# Patient Record
Sex: MALE | URBAN - METROPOLITAN AREA
[De-identification: names, ages, dates, MRNs, and addresses within clinical notes are randomized per-mention and may not be internally consistent; named-entity substitution may affect disease eponyms.]

---
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
## 2023-06-20 ENCOUNTER — OFFICE VISIT (OUTPATIENT)
Dept: OBGYN CLINIC | Facility: MEDICAL CENTER | Age: 18
End: 2023-06-20
Payer: MEDICARE

## 2023-06-20 VITALS — WEIGHT: 211 LBS | BODY MASS INDEX: 26.24 KG/M2 | HEIGHT: 75 IN

## 2023-06-20 DIAGNOSIS — S76.301A HAMSTRING INJURY, RIGHT, INITIAL ENCOUNTER: Primary | ICD-10-CM

## 2023-06-20 DIAGNOSIS — M79.604 RIGHT LEG PAIN: ICD-10-CM

## 2023-06-20 PROCEDURE — 99203 OFFICE O/P NEW LOW 30 MIN: CPT | Performed by: EMERGENCY MEDICINE

## 2023-06-20 NOTE — LETTER
June 20, 2023     Patient: Asif Donovan  YOB: 2005  Date of Visit: 6/20/2023      To Whom it May Concern:    Asif Donovan is under my professional care  Izzy Torres was seen in my office on 6/20/2023  May return to restricted duty:  Max lifting/pushing/pulling 5-10 lbs  Mostly sedentary duty with occasional walking/standing  No climbing/crawling  Restrictions until further notice  If you have any questions or concerns, please don't hesitate to call           Sincerely,          Jennie Chopra MD        CC: No Recipients

## 2023-06-20 NOTE — PROGRESS NOTES
"    Assessment/Plan:    Diagnoses and all orders for this visit:    Hamstring injury, right, initial encounter  -     MRI femur right wo contrast; Future  -     XR femur 2 vw right; Future    Right leg pain  -     MRI femur right wo contrast; Future  -     XR femur 2 vw right; Future    MRI Right Femur to r/o distal hamstring tendon injury which may need surgery  Patient does have significant ecchymosis of the posterior thigh  Extensive bruising of the posterior thigh/hamstring with tenderness to palpation of the distal hamstring muscle belly into the medial insertion tendon  The tendon is palpable but to a lesser extent compared to the left  WBAT with crutches, no sports    Return for scheduled by [facility], will call with results  Subjective:   Patient ID: Sylvia Álvarez is a 21 y o  male  NP presents with mother for Right hamstring injury occurring 6/4/23 while playing baseball tagging up off of the third base felt a pop of the hamstring  He took a week off and rested and return to play on a following Sunday and reaggravated his symptoms  He has noticed bruising of the hamstring area  No numbness tingling  Review of Systems    The following portions of the patient's chart were reviewed and updated as appropriate: Allergy:  No Known Allergies    Medications:  No current outpatient medications on file  There is no problem list on file for this patient  Objective:  Ht 6' 3\" (1 905 m)   Wt 95 7 kg (211 lb)   BMI 26 37 kg/m²     Right Knee Exam     Other   Erythema: absent  Sensation: normal    Comments:  Extensive bruising of the posterior thigh/hamstring with tenderness to palpation of the distal hamstring muscle belly into the medial insertion tendon  The tendon is palpable but to a lesser extent compared to the left  Physical Exam  Vitals reviewed  Constitutional:       Appearance: He is well-developed  HENT:      Head: Normocephalic and atraumatic     Eyes:      " Conjunctiva/sclera: Conjunctivae normal    Pulmonary:      Effort: Pulmonary effort is normal    Musculoskeletal:      Cervical back: Neck supple  Skin:     General: Skin is warm and dry  Neurological:      Mental Status: He is alert and oriented to person, place, and time  Psychiatric:         Behavior: Behavior normal            Neurologic Exam     Mental Status   Oriented to person, place, and time  Procedures    There are no pertinent studies obtained with regards to today's office visit  History reviewed  No pertinent past medical history  History reviewed  No pertinent surgical history  Social History     Socioeconomic History   • Marital status: Single     Spouse name: Not on file   • Number of children: Not on file   • Years of education: Not on file   • Highest education level: Not on file   Occupational History   • Not on file   Tobacco Use   • Smoking status: Unknown   • Smokeless tobacco: Not on file   Substance and Sexual Activity   • Alcohol use: Not on file   • Drug use: Not on file   • Sexual activity: Not on file   Other Topics Concern   • Not on file   Social History Narrative   • Not on file     Social Determinants of Health     Financial Resource Strain: Not on file   Food Insecurity: Not on file   Transportation Needs: Not on file   Physical Activity: Not on file   Stress: Not on file   Social Connections: Not on file   Intimate Partner Violence: Not on file   Housing Stability: Not on file       History reviewed  No pertinent family history

## 2023-06-23 ENCOUNTER — TELEPHONE (OUTPATIENT)
Dept: OBGYN CLINIC | Facility: HOSPITAL | Age: 18
End: 2023-06-23

## 2023-06-23 ENCOUNTER — APPOINTMENT (OUTPATIENT)
Dept: RADIOLOGY | Facility: CLINIC | Age: 18
End: 2023-06-23
Payer: MEDICARE

## 2023-06-23 DIAGNOSIS — S76.301A HAMSTRING INJURY, RIGHT, INITIAL ENCOUNTER: ICD-10-CM

## 2023-06-23 DIAGNOSIS — M79.604 RIGHT LEG PAIN: ICD-10-CM

## 2023-06-23 PROCEDURE — 73552 X-RAY EXAM OF FEMUR 2/>: CPT

## 2023-06-23 NOTE — TELEPHONE ENCOUNTER
Caller: Mother Mya    Doctor: Gloria Bailey    Reason for call:     Calling back with fax number for his work, fax # [phone], attn: Lisandra Arzola  Fax completed today    No further action    Call back#: n/a

## 2023-06-23 NOTE — TELEPHONE ENCOUNTER
Caller: Lawrence    Doctor/Office: Clement BURLESON#: [phone]    Work or school note: work    Return to work/school date: 6/20/23    Fax #: will call with fax number    Is there any restrictions that need to be updated? If so, what? Pt tried light duty but could not do the work    He is asking to be out of work

## 2023-06-27 ENCOUNTER — TELEPHONE (OUTPATIENT)
Dept: OBGYN CLINIC | Facility: CLINIC | Age: 18
End: 2023-06-27

## 2023-06-28 ENCOUNTER — HOSPITAL ENCOUNTER (OUTPATIENT)
Dept: MRI IMAGING | Facility: HOSPITAL | Age: 18
Discharge: HOME | End: 2023-06-28
Attending: EMERGENCY MEDICINE
Payer: MEDICARE

## 2023-06-28 DIAGNOSIS — S76.301A HAMSTRING INJURY, RIGHT, INITIAL ENCOUNTER: ICD-10-CM

## 2023-06-28 DIAGNOSIS — M79.604 RIGHT LEG PAIN: ICD-10-CM

## 2023-06-28 PROCEDURE — 73718 MRI LOWER EXTREMITY W/O DYE: CPT

## 2023-06-28 PROCEDURE — G1004 CDSM NDSC: HCPCS

## 2023-06-29 ENCOUNTER — TELEPHONE (OUTPATIENT)
Dept: OBGYN CLINIC | Facility: HOSPITAL | Age: 18
End: 2023-06-29

## 2023-06-29 DIAGNOSIS — S76.301A HAMSTRING INJURY, RIGHT, INITIAL ENCOUNTER: Primary | ICD-10-CM

## 2023-06-29 DIAGNOSIS — M79.604 RIGHT LEG PAIN: ICD-10-CM

## 2023-06-29 NOTE — TELEPHONE ENCOUNTER
Spoke to patient and relayed MRI results.  May WBAT, PT ordered, remain out of work, f/u 4 weeks.  Patient requested I call his mother to discuss the results of the MRI I did call her and leave a message with the findings and my recommendations.

## 2023-07-05 ENCOUNTER — TELEPHONE (OUTPATIENT)
Dept: OBGYN CLINIC | Facility: HOSPITAL | Age: 18
End: 2023-07-05

## 2023-07-05 NOTE — TELEPHONE ENCOUNTER
Caller: Patient' mother Mya    Doctor: Vinny Santillan    Reason for call: Patient's employer is asking him to RTW. He is calling for your opinion on this. Please advise.     Call back#: [phone]

## 2023-07-06 NOTE — TELEPHONE ENCOUNTER
Caller: mom    Doctor: Yi Rivera    Reason for call: patient states they are interested in going to work on the 10th.  They want to know the Drs opinion    Also requested recent work status note to be faxed over to [employer] # [phone]  -work note was electronically faxed      Call back#: [phone]

## 2023-07-07 ENCOUNTER — TELEPHONE (OUTPATIENT)
Dept: OBGYN CLINIC | Facility: HOSPITAL | Age: 18
End: 2023-07-07

## 2023-07-07 NOTE — TELEPHONE ENCOUNTER
Caller: patient    Doctor/Office: Clement    CB#:       What needs to be faxed: 6/24/23 work note    ATTN to: Cassandra Linn    Fax#: [phone]      Documents were successfully e-faxed

## 2023-07-13 ENCOUNTER — TELEPHONE (OUTPATIENT)
Dept: OBGYN CLINIC | Facility: CLINIC | Age: 18
End: 2023-07-13

## 2023-07-13 ENCOUNTER — EVALUATION (OUTPATIENT)
Dept: PHYSICAL THERAPY | Facility: CLINIC | Age: 18
End: 2023-07-13
Payer: MEDICARE

## 2023-07-13 DIAGNOSIS — M79.604 RIGHT LEG PAIN: Primary | ICD-10-CM

## 2023-07-13 DIAGNOSIS — S76.301A HAMSTRING INJURY, RIGHT, INITIAL ENCOUNTER: ICD-10-CM

## 2023-07-13 PROCEDURE — 97161 PT EVAL LOW COMPLEX 20 MIN: CPT

## 2023-07-13 PROCEDURE — 97110 THERAPEUTIC EXERCISES: CPT

## 2023-07-13 NOTE — PROGRESS NOTES
PT Evaluation     Today's date: 2023  Patient name: Mary Villanueva  : 2005  MRN: [id number]  Referring provider: Nohemi Abraham MD  Dx:   Encounter Diagnosis     ICD-10-CM    1. Right leg pain  M79.604 Ambulatory Referral to Physical Therapy      2. Hamstring injury, right, initial encounter  S76.301A Ambulatory Referral to Physical Therapy                     Assessment  Assessment details: Pt is a 21y.o. year old male that presents to outpatient physical therapy with a hamstring injury to the R LE. Pt demonstrate pain with palpation to the R biceps femoris, decrease hamstring length on the R vs the L, and limited muscle strength to the R knee/hip extensors. Pt demonstrates increased pain, decreased ROM, decreased strength, decreased activity tolerance, and decreased functional activity due to pain. Education was given on typical physiological repsonse following muscle strain and benefit of physical therapy for return to PLOF. Education was given on the risk of re-injury. Pt appears motivated. HEP was given and reviewed with patient. would benefit from skilled physical therapy to address noted impairments, meet patient's goals, and to return to PLOF. Thank you for this referral.     Impairments: abnormal gait, abnormal muscle tone, abnormal or restricted ROM, activity intolerance, impaired physical strength, lacks appropriate home exercise program and pain with function    Symptom irritability: lowUnderstanding of Dx/Px/POC: good   Prognosis: good    Goals  STGs:   1. Pt will be able to demonstrate an increase of strength by at least 1/2 grade within 4 weeks   2. Pt will be able to achieve increased ROM by at least 25% within 4 weeks. 3. Pt will be able to report pain less than 3/10 at worse within 4 weeks. 4. Pt will be able to demonstrate improved hip abductor strength to at least 4+/5 MMT within 4 weeks   5.  Pt will improve prone knee flexion strength by at least 10 lbs of force at 45 degrees of flexion within 4 weeks  6. Pt will be able to demonstrate less than or equal to 5 degrees of knee extension difference with H-test on the R within 4 weeks. LTGs:   1. Pt will be independent with all IADLs without pain or discomfort upon discharge. 2. Pt will be independent with HEP upon discharge   3. Pt will be able to report no pain or discomfort with all work duties and recreational activities for a full day upon discharge. 4. Pt will be able to report 'no difficulty' with heavy household activities such as cleaning or lifting at least 10 lbs to waist upon discharge       Plan  Patient would benefit from: PT eval and skilled physical therapy  Planned modality interventions: low level laser therapy, thermotherapy: hydrocollator packs, electrical stimulation/Russian stimulation and cryotherapy  Planned therapy interventions: manual therapy, massage, Ortiz taping, muscle pump exercises, neuromuscular re-education, patient education, strengthening, stretching, therapeutic activities, therapeutic exercise, therapeutic training, home exercise program, functional ROM exercises, flexibility, breathing training, body mechanics training, balance, IASTM, joint mobilization, activity modification, abdominal trunk stabilization and balance/weight bearing training  Frequency: 2x week  Duration in weeks: 8  Treatment plan discussed with: patient        Subjective Evaluation    History of Present Illness  Mechanism of injury: Pt states that he has been have pain/discomfort for the past few weeks. He states that he pulled his hamstring playing baseball 6/4/2023. Did not get it checked out or did not get any xrays done. He was resting it and putting ice on it. He was feeling better and he started playing baseball again on 6/18/2023 and its back to hurting him again. Reports that the back of his thigh was bruised. Recent MRI finds partial tear at the myotendinous junction of the biceps femur.   He is also missing some work because of his pain. Has not been playing any sport since. Denies numbness/tingling      AGGS: walking, running  EASES: ice, rest, stretching   Goals: decrease pain, return to sport/running without pain       Imaging findings: MRI on 2023 - (R) Hematoma formation in the posterior mid thigh located in between the biceps femoris and semitendinosis muscles with a partial tear at the myotendinous junction of the biceps femoris. The proximal common hamstring tendons appear normal.    Patient Goals  Patient goals for therapy: decreased pain, increased motion, increased strength, return to sport/leisure activities and return to work    Pain  Current pain ratin  At best pain ratin  At worst pain ratin  Quality: dull ache, tight and pulling          Objective     Palpation     Right   Hypertonic in the proximal biceps femoris. Tenderness of the proximal biceps femoris.      Additional Palpation Details  Maximum tenderness and tightness noted with palpation 16 cm proximal to R knee joint line at biceps femoris     Active Range of Motion     Right Knee   Normal active range of motion    Strength/Myotome Testing     Left Hip   Planes of Motion   Flexion: 4+  Extension: 4+  Abduction: 4  Adduction: 4+    Right Hip   Planes of Motion   Flexion: 4  Extension: 4  Abduction: 4-  Adduction: 4    Left Knee   Extension: 4+  Quadriceps contraction: good    Right Knee   Extension: 4+  Quadriceps contraction: good    Left Ankle/Foot   Dorsiflexion: 4+  Plantar flexion: 5    Right Ankle/Foot   Dorsiflexion: 4+  Plantar flexion: 5    Additional Strength Details  Prone knee flexion (L):     - 90 degrees = 24.6    - 45 degrees = 23.4     - 15 degrees = 26.0    Prone knee flexion (L):     - 90 degrees = 28.8    - 45 degrees = 22.4     - 15 degrees = 25.3    Tests     Additional Tests Details  Hamstring length in 90/90 supine:     - Right side = lacking 45%     - Left side = lacking 38%    Single leg sit to stand     - limited knee control with sit to stand test with noted genu valgus (B)    Prone hip extension activation - significant hamstring activation prior to glut max with testing bilaterally (R>L)               Precautions: none    Daily Treatment Diary    Date 7/13            FOTO IE -             Re-Eval IE               Manuals    Biceps Femoris IASTM              HS stretch              Bernardino stretch                           Neuro Re-Ed     Prone glut/HS ISO             Double Leg RDLs             Bridge with PPT 3x10                                                                Ther Ex    Recumbent bike - ROM             Seated HS stretch  30"x3            Supine knee slides              Prone eccentric HS curl              Side plank with hip abduction              Dead bug             Side stepping w/ TB Green TB 2x10 ea direction                                                                 Ther Activity                              Gait Training                              Modalities    ICE

## 2023-07-13 NOTE — TELEPHONE ENCOUNTER
Caller: Patient's mom    Doctor: Ariella Allen    Reason for call: Pt is requesting a return to work letter dated for Monday 7/17/23.   Fax# [phone]  ATTN: ANGELES    Call back#: [phone]

## 2023-07-14 NOTE — TELEPHONE ENCOUNTER
Caller: Mom    Doctor/Office: Clement    Call back#: [phone]    What needs to be faxed: Work note    ATTN to: Muna Stevens    Fax#: *[phone]      Documents were successfully e-faxed

## 2023-07-20 ENCOUNTER — OFFICE VISIT (OUTPATIENT)
Dept: PHYSICAL THERAPY | Facility: CLINIC | Age: 18
End: 2023-07-20
Payer: MEDICARE

## 2023-07-20 DIAGNOSIS — M79.604 RIGHT LEG PAIN: Primary | ICD-10-CM

## 2023-07-20 DIAGNOSIS — S76.301A HAMSTRING INJURY, RIGHT, INITIAL ENCOUNTER: ICD-10-CM

## 2023-07-20 PROCEDURE — 97112 NEUROMUSCULAR REEDUCATION: CPT

## 2023-07-20 PROCEDURE — 97110 THERAPEUTIC EXERCISES: CPT

## 2023-07-20 PROCEDURE — 97140 MANUAL THERAPY 1/> REGIONS: CPT

## 2023-07-20 NOTE — PROGRESS NOTES
Daily Note     Today's date: 2023  Patient name: Andie Kan  : 2005  MRN: [id number]  Referring provider: Maribel Rivers MD  Dx:   Encounter Diagnosis     ICD-10-CM    1. Right leg pain  M79.604       2. Hamstring injury, right, initial encounter  S76.301A                      Subjective: Pt states that he is feeling pretty good. Indicates that he had a very busy/heavy lifting day today at work and his hamstring felt pretty good overall. Indicates he has been able to complete the HEP regularly. Objective: See treatment diary below      Assessment: Tolerated treatment well. Manual techniques were perform to improve the R hamstring length and to improve hip mobility. Activities were also performed to improve HS and proximal hip strength. Pt denied symtpoms with activities today. [facility] and to limit activities to 'within tolerance' and to limit stress on R hamstring. Patient demonstrated fatigue post treatment and would benefit from continued PT      Plan: Continue per plan of care.       Precautions: none    Daily Treatment Diary    Date            FOTO IE -             Re-Eval IE               Manuals    Biceps Femoris IASTM   NV           HS stretch   JM           Bernardino stretch              Hip PROM   JM                         Neuro Re-Ed     Prone glut/HS ISO  NV           Double Leg RDLs  3x10 15# ea UE           Bridge with PPT 3x10 10x                                                               Ther Ex    Recumbent bike - ROM  upright bike 5 mins           Seated HS stretch  30"x3 3" 30x           Supine knee slides   On TBall 3x7           Prone eccentric HS curl   5# rapid flexion eccentric extension 3x10            Side plank with hip abduction   NV           Dead bug  NV           Side stepping w/ TB Green TB 2x10 ea direction  Figure 8 pink TB 30" 6x                                                               Ther Activity Gait Training                              Modalities    ICE

## 2023-07-24 ENCOUNTER — OFFICE VISIT (OUTPATIENT)
Dept: PHYSICAL THERAPY | Facility: CLINIC | Age: 18
End: 2023-07-24
Payer: MEDICARE

## 2023-07-24 DIAGNOSIS — M79.604 RIGHT LEG PAIN: Primary | ICD-10-CM

## 2023-07-24 DIAGNOSIS — S76.301A HAMSTRING INJURY, RIGHT, INITIAL ENCOUNTER: ICD-10-CM

## 2023-07-24 PROCEDURE — 97110 THERAPEUTIC EXERCISES: CPT

## 2023-07-24 PROCEDURE — 97140 MANUAL THERAPY 1/> REGIONS: CPT

## 2023-07-24 PROCEDURE — 97112 NEUROMUSCULAR REEDUCATION: CPT

## 2023-07-24 NOTE — PROGRESS NOTES
Daily Note     Today's date: 2023  Patient name: Jose Avendano  : 2005  MRN: [id number]  Referring provider: Bertha Duron MD  Dx:   Encounter Diagnosis     ICD-10-CM    1. Right leg pain  M79.604       2. Hamstring injury, right, initial encounter  S76.301A                      Subjective: Pt states that he is doing well. Reports that he felt good following last session, and has not had much pain over the last few days. Reports that he has not returned to sports yet. Objective: See treatment diary below      Assessment: Tolerated treatment well. Activities were performed to improve hamstring strength, length, endurance, and proprioception. Denied pain throughout session. Able to tolerate increase weight and reps. Recommended to continue to limit return to full sports at this time. Patient demonstrated fatigue post treatment and would benefit from continued PT      Plan: Continue per plan of care.       Precautions: none    Daily Treatment Diary    Date           FOTO IE -             Re-Eval IE               Manuals    Biceps Femoris IASTM   NV JM          HS stretch   [facility]          Bernardino stretch              Hip PROM   JM  JM                       Neuro Re-Ed     Prone glut/HS ISO  NV 3x10          Double Leg RDLs  3x10 15# ea UE 2x10 25# ea UE    SL RDLs 25# 2x10          Bridge with PPT 3x10 10x SL 2x10                                                               Ther Ex    Recumbent bike - ROM  upright bike 5 mins Upright bike 1 mile           Seated HS stretch  30"x3 3" 30x 3" 30x          Supine knee slides   On TBall 3x7 NV          Prone eccentric HS curl   5# rapid flexion eccentric extension 3x10  7.5# rapid flexion eccentric extension 3x10           Side plank with hip abduction   NV           Dead bug  NV           Side stepping w/ TB Green TB 2x10 ea direction  Figure 8 pink TB 30" 6x Figure 8 pink TB 30" 6x          Leg press   185# 3x10 Ther Activity                              Gait Training                              Modalities    ICE

## 2023-07-27 ENCOUNTER — OFFICE VISIT (OUTPATIENT)
Dept: PHYSICAL THERAPY | Facility: CLINIC | Age: 18
End: 2023-07-27
Payer: MEDICARE

## 2023-07-27 DIAGNOSIS — S76.301A HAMSTRING INJURY, RIGHT, INITIAL ENCOUNTER: ICD-10-CM

## 2023-07-27 DIAGNOSIS — M79.604 RIGHT LEG PAIN: Primary | ICD-10-CM

## 2023-07-27 PROCEDURE — 97110 THERAPEUTIC EXERCISES: CPT

## 2023-07-27 PROCEDURE — 97140 MANUAL THERAPY 1/> REGIONS: CPT

## 2023-07-27 PROCEDURE — 97112 NEUROMUSCULAR REEDUCATION: CPT

## 2023-07-27 NOTE — PROGRESS NOTES
Daily Note     Today's date: 2023  Patient name: Ingrid Munoz  : 2005  MRN: [id number]  Referring provider: Wil Bloom MD  Dx:   Encounter Diagnosis     ICD-10-CM    1. Right leg pain  M79.604       2. Hamstring injury, right, initial encounter  S76.301A                      Subjective: Pt states that he is doing well today. Denies any symptoms following last session, states that he has been feeling pretty good overall. Objective: See treatment diary below      Assessment: Tolerated treatment well. Activities were performed to improve LE strength with emphasis on improve hamstring strength and length. Education was given on DOMS following increase of strengthening activities and to continue to ice and stretch daily. Patient demonstrated fatigue post treatment and would benefit from continued PT      Plan: Continue per plan of care.       Precautions: none    Daily Treatment Diary    Date          FOTO IE -             Re-Eval IE               Manuals    Biceps Femoris IASTM   NV JM          HS stretch   JM JM CELIA         Bernardino stretch              Hip PROM   JM  CELIA JM                      Neuro Re-Ed     Prone glut/HS ISO  NV 3x10          Double Leg RDLs  3x10 15# ea UE 2x10 25# ea UE    SL RDLs 25# 2x10          Bridge with PPT 3x10 10x SL 2x10  211/169 blue  DL 30/15/1515                                                             Ther Ex    Recumbent bike - ROM  upright bike 5 mins Upright bike 1 mile  Upright bike 1 mile          BFR    211/169 blue         Seated HS stretch  30"x3 3" 30x 3" 30x          Supine knee slides   On TBall 3x7 NV          Prone eccentric HS curl   5# rapid flexion eccentric extension 3x10  7.5# rapid flexion eccentric extension 3x10           Side plank with hip abduction   NV           Dead bug  NV           Side stepping w/ TB Green TB 2x10 ea direction  Figure 8 pink TB 30" 6x Figure 8 pink TB 30" 6x          Leg press   185# 3x10 211/169 blue  DL 30/15/1515  SL 85#-55#         HS curl machine     50#   211/169 blue  DL 30/15/1515         Sprint to jog    6x                                    Ther Activity                              Gait Training                              Modalities    ICE

## 2023-07-31 ENCOUNTER — OFFICE VISIT (OUTPATIENT)
Dept: PHYSICAL THERAPY | Facility: CLINIC | Age: 18
End: 2023-07-31
Payer: MEDICARE

## 2023-07-31 DIAGNOSIS — M79.604 RIGHT LEG PAIN: Primary | ICD-10-CM

## 2023-07-31 DIAGNOSIS — S76.301A HAMSTRING INJURY, RIGHT, INITIAL ENCOUNTER: ICD-10-CM

## 2023-07-31 PROCEDURE — 97110 THERAPEUTIC EXERCISES: CPT

## 2023-07-31 PROCEDURE — 97140 MANUAL THERAPY 1/> REGIONS: CPT

## 2023-07-31 NOTE — PROGRESS NOTES
Daily Note     Today's date: 2023  Patient name: Avis Green  : 2005  MRN: [id number]  Referring provider: Scarlet Hoyt MD  Dx:   Encounter Diagnosis     ICD-10-CM    1. Right leg pain  M79.604       2. Hamstring injury, right, initial encounter  S76.301A                      Subjective: Pt states that he is doing well. Denies symptoms following last session. Reports that he has been feeling good. Objective: See treatment diary below      Assessment: Tolerated treatment well. Denied pain throughout session. Tolerated additional plyometric, agility activities without pain and compensations. Continues to demonstrate slight limitations with hamstring length and proximal hip weakness. Recommended to perform an active warm up prior to physcail activity as well as static hamstring stretch. Patient demonstrated fatigue post treatment and would benefit from continued PT      Plan: Continue per plan of care. Potential discharge next session.       Precautions: none    Daily Treatment Diary    Date         FOTO IE -     perf         Re-Eval IE               Manuals    Biceps Femoris IASTM   NV JM          HS stretch   CELIA YANG        Bernardino stretch              Hip PROM   CELIA YANG                     Neuro Re-Ed     Prone glut/HS ISO  NV 3x10          Double Leg RDLs  3x10 15# ea UE 2x10 25# ea UE    SL RDLs 25# 2x10          Bridge with PPT 3x10 10x SL 2x10  211/169 blue  DL 30/15/1515                                                              Ther Ex    Recumbent bike - ROM  upright bike 5 mins Upright bike 1 mile  Upright bike 1 mile  Upright bike 1 mile     TM 9.0 MPH 1 min         BFR    211/169 blue 178/142 blue        Seated HS stretch  30"x3 3" 30x 3" 30x  3" 20x        Supine knee slides   On TBall 3x7 NV          Prone eccentric HS curl   5# rapid flexion eccentric extension 3x10  7.5# rapid flexion eccentric extension 3x10   178/142 blue 7.5# eccentric 30/15/15/15        Side plank with hip abduction   NV           Dead bug  NV           Side stepping w/ TB Green TB 2x10 ea direction  Figure 8 pink TB 30" 6x Figure 8 pink TB 30" 6x  Figure 8 pink TB 30" 6x        Leg press   185# 3x10 211/169 blue  DL 30/15/1515  SL 85#-55# 211/169 blue  DL 30/15/1515  SL 65#        HS curl machine     50#   211/169 blue  DL 30/15/1515         Sprint to jog    6x  50" x4:     Jogging     Side shuffle    [place]     Sprint to jog                                  Ther Activity                              Gait Training                              Modalities    ICE

## 2023-08-03 ENCOUNTER — OFFICE VISIT (OUTPATIENT)
Dept: PHYSICAL THERAPY | Facility: CLINIC | Age: 18
End: 2023-08-03
Payer: MEDICARE

## 2023-08-03 DIAGNOSIS — S76.301A HAMSTRING INJURY, RIGHT, INITIAL ENCOUNTER: ICD-10-CM

## 2023-08-03 DIAGNOSIS — M79.604 RIGHT LEG PAIN: Primary | ICD-10-CM

## 2023-08-03 PROCEDURE — 97110 THERAPEUTIC EXERCISES: CPT

## 2023-08-03 NOTE — PROGRESS NOTES
Daily Note + Discharge Note    Today's date: 8/3/2023  Patient name: Adrian Gentile  : 2005  MRN: [id number]  Referring provider: Cass Sage MD  Dx:   Encounter Diagnosis     ICD-10-CM    1. Right leg pain  M79.604       2. Hamstring injury, right, initial encounter  S76.301A                      Subjective: Pt states that he is doing well today. Denies any issues, pain or tightness over the last few days. Denies any changes since last session. Plan to discharge with HEP today. Objective: See treatment diary below    Pain  Current pain ratin  At best pain ratin  At worst pain ratin    Strength/Myotome Testing     Left Hip   Planes of Motion   Flexion: 4+  Extension: 4+  Abduction: 4+  Adduction: 4+    Right Hip   Planes of Motion   Flexion: 4+  Extension: 4  Abduction: 4+  Adduction: 4+    Left Knee   Extension: 5  Quadriceps contraction: good    Right Knee   Extension: 5  Quadriceps contraction: good      Tests   Additional Tests Details  Hamstring length in 90/90 supine:     - Right side = lacking 29%     - Left side = lacking 25%    Assessment: Tolerated treatment well. Pt has demonstrated improve hamstring length, strength and proximal stability with proximal strength; denies tenderness with palpation. Still demonstrates minor hamstring activation prior to glut activation with prone hip extension. Patient demonstrated fatigue post treatment and exhibited good technique with therapeutic exercises. Plan to discharge patient today from formal physical therapy with HEP which was reviewed today. Recommended to continue with HEP for at least 3-4 more weeks and to ease into return to full sports within tolerance. Plan:   Advised patient to continue with home exercise program which I reviewed with them today. Advised patient to contact me with any future questions or concerns regarding exercise. Pt is in agreement with discharge plan.        Precautions: none    Daily Treatment Diary    Date 7/13 7/20 7/24 7/27 7/31 8/3       FOTO IE -     perf         Re-Eval IE     PERF          Manuals    Biceps Femoris IASTM   NV JM          HS stretch   CELIA YANG JM JM        Bernardino stretch              Hip PROM   JM  CELIA JM JM                     Neuro Re-Ed     Prone glut/HS ISO  NV 3x10   10x       Double Leg RDLs  3x10 15# ea UE 2x10 25# ea UE    SL RDLs 25# 2x10   SL RDLs 40# 3x10       Bridge with PPT 3x10 10x SL 2x10  211/169 blue  DL 30/15/1515                                                              Ther Ex    Recumbent bike - ROM  upright bike 5 mins Upright bike 1 mile  Upright bike 1 mile  Upright bike 1 mile     TM 9.0 MPH 1 min  Upright bike 1 mile        BFR    211/169 blue 178/142 blue        Seated HS stretch  30"x3 3" 30x 3" 30x  3" 20x        Supine knee slides   On TBall 3x7 NV          Prone eccentric HS curl   5# rapid flexion eccentric extension 3x10  7.5# rapid flexion eccentric extension 3x10   178/142 blue 7.5# eccentric 30/15/15/15        Side plank with hip abduction   NV           Dead bug  NV           Side stepping w/ TB Green TB 2x10 ea direction  Figure 8 pink TB 30" 6x Figure 8 pink TB 30" 6x  Figure 8 pink TB 30" 6x Figure 8 green TB 30" 6x       Leg press   185# 3x10 211/169 blue  DL 30/15/1515  SL 85#-55# 211/169 blue  DL 30/15/1515  SL 65# SL rapid 145# 5x10        HS curl machine     50#   211/169 blue  DL 30/15/1515         Sprint to jog    6x  50" x4:     Jogging     Side shuffle    [place]     Sprint to jog                                  Ther Activity                              Gait Training                              Modalities    ICE